# Patient Record
Sex: FEMALE | Race: BLACK OR AFRICAN AMERICAN | NOT HISPANIC OR LATINO
[De-identification: names, ages, dates, MRNs, and addresses within clinical notes are randomized per-mention and may not be internally consistent; named-entity substitution may affect disease eponyms.]

---

## 2017-10-31 ENCOUNTER — APPOINTMENT (OUTPATIENT)
Dept: CARDIOLOGY | Facility: CLINIC | Age: 62
End: 2017-10-31
Payer: COMMERCIAL

## 2017-10-31 ENCOUNTER — NON-APPOINTMENT (OUTPATIENT)
Age: 62
End: 2017-10-31

## 2017-10-31 VITALS
BODY MASS INDEX: 26.29 KG/M2 | OXYGEN SATURATION: 98 % | SYSTOLIC BLOOD PRESSURE: 135 MMHG | WEIGHT: 154 LBS | DIASTOLIC BLOOD PRESSURE: 82 MMHG | HEIGHT: 64 IN | HEART RATE: 76 BPM

## 2017-10-31 DIAGNOSIS — Z83.3 FAMILY HISTORY OF DIABETES MELLITUS: ICD-10-CM

## 2017-10-31 DIAGNOSIS — Z78.9 OTHER SPECIFIED HEALTH STATUS: ICD-10-CM

## 2017-10-31 PROCEDURE — 93000 ELECTROCARDIOGRAM COMPLETE: CPT

## 2017-10-31 PROCEDURE — 99204 OFFICE O/P NEW MOD 45 MIN: CPT

## 2017-12-04 ENCOUNTER — APPOINTMENT (OUTPATIENT)
Dept: CARDIOLOGY | Facility: CLINIC | Age: 62
End: 2017-12-04
Payer: COMMERCIAL

## 2017-12-04 PROCEDURE — 93306 TTE W/DOPPLER COMPLETE: CPT

## 2017-12-04 PROCEDURE — 93015 CV STRESS TEST SUPVJ I&R: CPT

## 2017-12-21 ENCOUNTER — APPOINTMENT (OUTPATIENT)
Dept: CARDIOLOGY | Facility: CLINIC | Age: 62
End: 2017-12-21
Payer: COMMERCIAL

## 2017-12-21 PROCEDURE — A9500: CPT

## 2017-12-21 PROCEDURE — 93015 CV STRESS TEST SUPVJ I&R: CPT

## 2017-12-21 PROCEDURE — 78452 HT MUSCLE IMAGE SPECT MULT: CPT

## 2021-12-10 ENCOUNTER — APPOINTMENT (OUTPATIENT)
Dept: CARDIOLOGY | Facility: CLINIC | Age: 66
End: 2021-12-10
Payer: MEDICARE

## 2021-12-10 VITALS
TEMPERATURE: 98.3 F | HEART RATE: 71 BPM | WEIGHT: 169 LBS | DIASTOLIC BLOOD PRESSURE: 60 MMHG | HEIGHT: 64 IN | BODY MASS INDEX: 28.85 KG/M2 | SYSTOLIC BLOOD PRESSURE: 124 MMHG | OXYGEN SATURATION: 99 %

## 2021-12-10 VITALS — SYSTOLIC BLOOD PRESSURE: 120 MMHG | DIASTOLIC BLOOD PRESSURE: 62 MMHG

## 2021-12-10 DIAGNOSIS — Z86.39 PERSONAL HISTORY OF OTHER ENDOCRINE, NUTRITIONAL AND METABOLIC DISEASE: ICD-10-CM

## 2021-12-10 DIAGNOSIS — R94.31 ABNORMAL ELECTROCARDIOGRAM [ECG] [EKG]: ICD-10-CM

## 2021-12-10 DIAGNOSIS — Z82.49 FAMILY HISTORY OF ISCHEMIC HEART DISEASE AND OTHER DISEASES OF THE CIRCULATORY SYSTEM: ICD-10-CM

## 2021-12-10 DIAGNOSIS — Z78.9 OTHER SPECIFIED HEALTH STATUS: ICD-10-CM

## 2021-12-10 DIAGNOSIS — R00.2 PALPITATIONS: ICD-10-CM

## 2021-12-10 DIAGNOSIS — I10 ESSENTIAL (PRIMARY) HYPERTENSION: ICD-10-CM

## 2021-12-10 DIAGNOSIS — Z00.00 ENCOUNTER FOR GENERAL ADULT MEDICAL EXAMINATION W/OUT ABNORMAL FINDINGS: ICD-10-CM

## 2021-12-10 DIAGNOSIS — E11.9 TYPE 2 DIABETES MELLITUS W/OUT COMPLICATIONS: ICD-10-CM

## 2021-12-10 PROCEDURE — 99205 OFFICE O/P NEW HI 60 MIN: CPT

## 2021-12-10 PROCEDURE — 93000 ELECTROCARDIOGRAM COMPLETE: CPT | Mod: 59

## 2021-12-10 PROCEDURE — 93246 EXT ECG>7D<15D RECORDING: CPT

## 2021-12-10 RX ORDER — METFORMIN HYDROCHLORIDE 1000 MG/1
1000 TABLET, COATED ORAL
Refills: 0 | Status: DISCONTINUED | COMMUNITY
End: 2021-12-10

## 2021-12-10 RX ORDER — GLIMEPIRIDE 4 MG/1
4 TABLET ORAL DAILY
Refills: 0 | Status: ACTIVE | COMMUNITY

## 2021-12-10 RX ORDER — METFORMIN HYDROCHLORIDE 500 MG/1
500 TABLET, FILM COATED, EXTENDED RELEASE ORAL DAILY
Refills: 0 | Status: ACTIVE | COMMUNITY

## 2021-12-10 RX ORDER — METOPROLOL TARTRATE 25 MG/1
25 TABLET, FILM COATED ORAL
Qty: 3 | Refills: 3 | Status: ACTIVE | COMMUNITY
Start: 2021-12-10 | End: 1900-01-01

## 2021-12-10 RX ORDER — LISINOPRIL 5 MG/1
5 TABLET ORAL DAILY
Refills: 0 | Status: ACTIVE | COMMUNITY

## 2021-12-10 RX ORDER — SITAGLIPTIN 100 MG/1
100 TABLET, FILM COATED ORAL DAILY
Refills: 0 | Status: ACTIVE | COMMUNITY

## 2021-12-10 RX ORDER — ATORVASTATIN CALCIUM 10 MG/1
10 TABLET, FILM COATED ORAL
Qty: 60 | Refills: 5 | Status: ACTIVE | COMMUNITY

## 2021-12-10 NOTE — HISTORY OF PRESENT ILLNESS
[FreeTextEntry1] : Abnormal EKG. and palpitaitons. \par \par 62 F with hyperlipidemia and diabetes presents with abnormal ECG.  Low voltage QRS on ECG at PCP.  No routine exercise.  METS > 4.  last stress test 2017.  \par She had palptiations 2 meths ago. she went urgi care. PCP.  and he got an ECg . and the ECtg was abnormal. \par no dizzines. no syncope.\par  \par \par old note: 62 F with hyperlipidemia and diabetes presents with abnormal ECG.  Low voltage QRS on ECG at PCP.  No routine exercise.  METS > 4.   No chest pain or shortness of breath.  No palpitations, no dizziness, syncope. No PND, orthopnea, or LE edema.   Last HbA1c > 8.  Had a stress test, echo, and cardiac monitor - performed she recalls within the past 2 yrs.  Unclear why she had workup in the past.  Poor historian.  She doesn't recall ever being told her ECG was abnormal before.

## 2021-12-10 NOTE — DISCUSSION/SUMMARY
[Patient] : the patient [Risks] : risks [Benefits] : benefits [Alternatives] : alternatives [With Me] : with me [___ Month(s)] : in [unfilled] month(s) [FreeTextEntry1] : 62 F with hyperlipidemia and diabetes presents with abnormal ECG.   \par \par 1) abnormnal EKG suggestive of ischemia.  NO cardiac symptoms. however,. high probablity of obstrucitve coronary artery disease . \par Cardaic cta  for diagnosis.  if cardaic cta does not get approve by insurance then will get Cardiac cath.  if cardiac cath not approved, then exercise nuclear stress test.  2D echo.\par initaites aspirin 81  ct statins,. \par 2) hypertension : ct current meds\par 3) dyslipidemia : ct statins\par 4) palpitatiosn : 4 weeks event monitor. \par Will order and review ECG for the above mentioned diagnosis/condition/symptoms

## 2021-12-10 NOTE — ASSESSMENT
[FreeTextEntry1] : 1. abnormal ECG -   Low voltage QRS with nonspecific T wave abnormalities.  No hx of COPD, not obese. No s/s of pericardial effusion. ? amyloidosis.  ETT and echo.  \par 2. hyperlipidemia - yearly lipid panel and LFTs with PCP.  Continue statin. \par \par Thank you for allowing me to participate in your patient's care.  Please contact me if there are any questions or concerns.\par

## 2021-12-10 NOTE — CARDIOLOGY SUMMARY
[___] : [unfilled] [de-identified] : 12 10 2021   Sinus sampsonm. ST changes suggestive of anterio ischemia.

## 2021-12-10 NOTE — PHYSICAL EXAM
[General Appearance - Well Developed] : well developed [Normal Appearance] : normal appearance [Well Groomed] : well groomed [General Appearance - Well Nourished] : well nourished [No Deformities] : no deformities [General Appearance - In No Acute Distress] : no acute distress [Normal Conjunctiva] : the conjunctiva exhibited no abnormalities [Eyelids - No Xanthelasma] : the eyelids demonstrated no xanthelasmas [Normal Oral Mucosa] : normal oral mucosa [No Oral Pallor] : no oral pallor [No Oral Cyanosis] : no oral cyanosis [Normal Jugular Venous A Waves Present] : normal jugular venous A waves present [Normal Jugular Venous V Waves Present] : normal jugular venous V waves present [No Jugular Venous Desai A Waves] : no jugular venous desai A waves [Heart Rate And Rhythm] : heart rate and rhythm were normal [Heart Sounds] : normal S1 and S2 [Murmurs] : no murmurs present [Respiration, Rhythm And Depth] : normal respiratory rhythm and effort [Exaggerated Use Of Accessory Muscles For Inspiration] : no accessory muscle use [Auscultation Breath Sounds / Voice Sounds] : lungs were clear to auscultation bilaterally [Abdomen Soft] : soft [Abdomen Tenderness] : non-tender [Abdomen Mass (___ Cm)] : no abdominal mass palpated [Abnormal Walk] : normal gait [Gait - Sufficient For Exercise Testing] : the gait was sufficient for exercise testing [Nail Clubbing] : no clubbing of the fingernails [Cyanosis, Localized] : no localized cyanosis [Petechial Hemorrhages (___cm)] : no petechial hemorrhages [Skin Color & Pigmentation] : normal skin color and pigmentation [] : no rash [No Venous Stasis] : no venous stasis [Skin Lesions] : no skin lesions [No Skin Ulcers] : no skin ulcer [No Xanthoma] : no  xanthoma was observed [Oriented To Time, Place, And Person] : oriented to person, place, and time [Affect] : the affect was normal [Mood] : the mood was normal [No Anxiety] : not feeling anxious

## 2021-12-20 LAB
25(OH)D3 SERPL-MCNC: 37 NG/ML
ANION GAP SERPL CALC-SCNC: 13 MMOL/L
APO B SERPL-MCNC: 96 MG/DL
APO LP(A) SERPL-MCNC: 26.7 NMOL/L
BUN SERPL-MCNC: 7 MG/DL
CALCIUM SERPL-MCNC: 9.8 MG/DL
CHLORIDE SERPL-SCNC: 102 MMOL/L
CHOLEST SERPL-MCNC: 202 MG/DL
CO2 SERPL-SCNC: 23 MMOL/L
CREAT SERPL-MCNC: 0.61 MG/DL
GLUCOSE SERPL-MCNC: 129 MG/DL
HDLC SERPL-MCNC: 70 MG/DL
LDLC SERPL CALC-MCNC: 113 MG/DL
NONHDLC SERPL-MCNC: 133 MG/DL
POTASSIUM SERPL-SCNC: 4.1 MMOL/L
SODIUM SERPL-SCNC: 138 MMOL/L
TRIGL SERPL-MCNC: 97 MG/DL
TSH SERPL-ACNC: 1.3 UIU/ML

## 2021-12-21 ENCOUNTER — APPOINTMENT (OUTPATIENT)
Dept: CARDIOLOGY | Facility: CLINIC | Age: 66
End: 2021-12-21
Payer: MEDICARE

## 2021-12-21 PROCEDURE — 93306 TTE W/DOPPLER COMPLETE: CPT

## 2021-12-22 ENCOUNTER — OUTPATIENT (OUTPATIENT)
Dept: OUTPATIENT SERVICES | Facility: HOSPITAL | Age: 66
LOS: 1 days | End: 2021-12-22
Payer: MEDICARE

## 2021-12-22 DIAGNOSIS — E11.9 TYPE 2 DIABETES MELLITUS WITHOUT COMPLICATIONS: ICD-10-CM

## 2021-12-22 PROCEDURE — 75574 CT ANGIO HRT W/3D IMAGE: CPT | Mod: 26

## 2021-12-22 PROCEDURE — 75574 CT ANGIO HRT W/3D IMAGE: CPT

## 2022-01-14 ENCOUNTER — NON-APPOINTMENT (OUTPATIENT)
Age: 67
End: 2022-01-14

## 2022-06-07 ENCOUNTER — APPOINTMENT (OUTPATIENT)
Dept: CARDIOLOGY | Facility: CLINIC | Age: 67
End: 2022-06-07

## 2023-01-25 ENCOUNTER — OFFICE (OUTPATIENT)
Dept: URBAN - METROPOLITAN AREA CLINIC 94 | Facility: CLINIC | Age: 68
Setting detail: OPHTHALMOLOGY
End: 2023-01-25
Payer: MEDICARE

## 2023-01-25 DIAGNOSIS — E11.3293: ICD-10-CM

## 2023-01-25 DIAGNOSIS — E11.9: ICD-10-CM

## 2023-01-25 DIAGNOSIS — H25.13: ICD-10-CM

## 2023-01-25 DIAGNOSIS — Z79.84: ICD-10-CM

## 2023-01-25 DIAGNOSIS — H40.013: ICD-10-CM

## 2023-01-25 DIAGNOSIS — H16.223: ICD-10-CM

## 2023-01-25 PROCEDURE — 92012 INTRM OPH EXAM EST PATIENT: CPT | Performed by: OPHTHALMOLOGY

## 2023-01-25 ASSESSMENT — VISUAL ACUITY
OD_BCVA: 20/40
OS_BCVA: 20/25-1

## 2023-01-25 ASSESSMENT — SPHEQUIV_DERIVED
OS_SPHEQUIV: 1.75
OD_SPHEQUIV: 1.375

## 2023-01-25 ASSESSMENT — PACHYMETRY
OS_CT_UM: 568
OD_CT_CORRECTION: -4
OS_CT_CORRECTION: -1
OD_CT_UM: 596

## 2023-01-25 ASSESSMENT — KERATOMETRY
OS_K1POWER_DIOPTERS: 41.00
OD_AXISANGLE_DEGREES: 064
OD_K1POWER_DIOPTERS: 41.00
OD_K2POWER_DIOPTERS: 41.50
OS_AXISANGLE_DEGREES: 167
OS_K2POWER_DIOPTERS: 41.50

## 2023-01-25 ASSESSMENT — TONOMETRY: OD_IOP_MMHG: 20

## 2023-01-25 ASSESSMENT — REFRACTION_AUTOREFRACTION
OS_SPHERE: +2.00
OS_CYLINDER: -0.50
OD_SPHERE: +1.75
OD_CYLINDER: -0.75
OD_AXIS: 094
OS_AXIS: 094

## 2023-01-25 ASSESSMENT — AXIALLENGTH_DERIVED
OD_AL: 23.8863
OS_AL: 23.7374

## 2023-01-25 ASSESSMENT — SUPERFICIAL PUNCTATE KERATITIS (SPK)
OS_SPK: 1+
OD_SPK: 1+

## 2023-01-25 ASSESSMENT — CONFRONTATIONAL VISUAL FIELD TEST (CVF)
OS_FINDINGS: FULL
OD_FINDINGS: FULL

## 2023-05-24 ENCOUNTER — Encounter (OUTPATIENT)
Dept: URBAN - METROPOLITAN AREA CLINIC 94 | Facility: CLINIC | Age: 68
Setting detail: OPHTHALMOLOGY
End: 2023-05-24
Payer: MEDICARE

## 2023-05-24 DIAGNOSIS — Z79.84: ICD-10-CM

## 2023-05-24 DIAGNOSIS — H40.013: ICD-10-CM

## 2023-05-24 DIAGNOSIS — H16.223: ICD-10-CM

## 2023-05-24 DIAGNOSIS — E11.3293: ICD-10-CM

## 2023-05-24 DIAGNOSIS — E11.9: ICD-10-CM

## 2023-05-24 DIAGNOSIS — H25.13: ICD-10-CM

## 2023-05-24 PROCEDURE — 99213 OFFICE O/P EST LOW 20 MIN: CPT | Performed by: PHYSICIAN ASSISTANT

## 2023-05-24 PROCEDURE — 92083 EXTENDED VISUAL FIELD XM: CPT | Performed by: PHYSICIAN ASSISTANT

## 2023-05-24 ASSESSMENT — REFRACTION_AUTOREFRACTION
OD_AXIS: 099
OS_CYLINDER: -1.00
OS_AXIS: 094
OD_SPHERE: +2.25
OS_SPHERE: +2.25
OD_CYLINDER: -1.25

## 2023-05-24 ASSESSMENT — PACHYMETRY
OD_CT_UM: 596
OS_CT_UM: 568
OS_CT_CORRECTION: -1
OD_CT_CORRECTION: -4

## 2023-05-24 ASSESSMENT — VISUAL ACUITY
OS_BCVA: 20/20
OD_BCVA: 20/25-1

## 2023-05-24 ASSESSMENT — KERATOMETRY
OS_K2POWER_DIOPTERS: 41.50
OS_K1POWER_DIOPTERS: 40.75
OD_K1POWER_DIOPTERS: 41.25
OS_AXISANGLE_DEGREES: 166
OD_K2POWER_DIOPTERS: 41.50
OD_AXISANGLE_DEGREES: 007

## 2023-05-24 ASSESSMENT — SUPERFICIAL PUNCTATE KERATITIS (SPK)
OS_SPK: 1+
OD_SPK: 1+

## 2023-05-24 ASSESSMENT — TONOMETRY
OS_IOP_MMHG: 18
OD_IOP_MMHG: 21
OD_IOP_MMHG: 19

## 2023-05-24 ASSESSMENT — CONFRONTATIONAL VISUAL FIELD TEST (CVF)
OS_FINDINGS: FULL
OD_FINDINGS: FULL

## 2023-05-24 ASSESSMENT — SPHEQUIV_DERIVED
OS_SPHEQUIV: 1.75
OD_SPHEQUIV: 1.625

## 2023-05-24 ASSESSMENT — AXIALLENGTH_DERIVED
OD_AL: 23.7403
OS_AL: 23.784

## 2023-11-22 ENCOUNTER — OFFICE (OUTPATIENT)
Dept: URBAN - METROPOLITAN AREA CLINIC 94 | Facility: CLINIC | Age: 68
Setting detail: OPHTHALMOLOGY
End: 2023-11-22
Payer: MEDICARE

## 2023-11-22 DIAGNOSIS — E11.3293: ICD-10-CM

## 2023-11-22 DIAGNOSIS — Z79.84: ICD-10-CM

## 2023-11-22 DIAGNOSIS — H25.13: ICD-10-CM

## 2023-11-22 DIAGNOSIS — H40.013: ICD-10-CM

## 2023-11-22 PROCEDURE — 92014 COMPRE OPH EXAM EST PT 1/>: CPT | Performed by: OPHTHALMOLOGY

## 2023-11-22 PROCEDURE — 92250 FUNDUS PHOTOGRAPHY W/I&R: CPT | Performed by: OPHTHALMOLOGY

## 2023-11-22 ASSESSMENT — REFRACTION_AUTOREFRACTION
OS_CYLINDER: -1.00
OD_AXIS: 089
OS_AXIS: 097
OD_SPHERE: +2.00
OD_CYLINDER: -0.75
OS_SPHERE: +2.25

## 2023-11-22 ASSESSMENT — CONFRONTATIONAL VISUAL FIELD TEST (CVF)
OD_FINDINGS: FULL
OS_FINDINGS: FULL

## 2023-11-22 ASSESSMENT — SUPERFICIAL PUNCTATE KERATITIS (SPK)
OD_SPK: 1+
OS_SPK: 1+

## 2023-11-22 ASSESSMENT — SPHEQUIV_DERIVED
OD_SPHEQUIV: 1.625
OS_SPHEQUIV: 1.75

## 2024-05-22 ENCOUNTER — OFFICE (OUTPATIENT)
Dept: URBAN - METROPOLITAN AREA CLINIC 94 | Facility: CLINIC | Age: 69
Setting detail: OPHTHALMOLOGY
End: 2024-05-22
Payer: MEDICARE

## 2024-05-22 DIAGNOSIS — H40.013: ICD-10-CM

## 2024-05-22 PROCEDURE — 92133 CPTRZD OPH DX IMG PST SGM ON: CPT | Performed by: PHYSICIAN ASSISTANT

## 2024-05-22 PROCEDURE — 92014 COMPRE OPH EXAM EST PT 1/>: CPT | Performed by: PHYSICIAN ASSISTANT

## 2024-05-22 ASSESSMENT — CONFRONTATIONAL VISUAL FIELD TEST (CVF)
OS_FINDINGS: FULL
OD_FINDINGS: FULL

## 2024-11-13 ENCOUNTER — OFFICE (OUTPATIENT)
Dept: URBAN - METROPOLITAN AREA CLINIC 94 | Facility: CLINIC | Age: 69
Setting detail: OPHTHALMOLOGY
End: 2024-11-13
Payer: MEDICARE

## 2024-11-13 DIAGNOSIS — E11.3293: ICD-10-CM

## 2024-11-13 DIAGNOSIS — H16.223: ICD-10-CM

## 2024-11-13 DIAGNOSIS — H40.013: ICD-10-CM

## 2024-11-13 DIAGNOSIS — E11.9: ICD-10-CM

## 2024-11-13 DIAGNOSIS — H25.13: ICD-10-CM

## 2024-11-13 PROCEDURE — 92250 FUNDUS PHOTOGRAPHY W/I&R: CPT | Performed by: OPHTHALMOLOGY

## 2024-11-13 PROCEDURE — 92014 COMPRE OPH EXAM EST PT 1/>: CPT | Performed by: OPHTHALMOLOGY

## 2024-11-13 ASSESSMENT — SUPERFICIAL PUNCTATE KERATITIS (SPK)
OD_SPK: 1+
OS_SPK: 1+

## 2024-11-13 ASSESSMENT — REFRACTION_AUTOREFRACTION
OD_CYLINDER: -1.00
OD_SPHERE: +2.25
OS_SPHERE: +2.75
OS_AXIS: 100
OS_CYLINDER: -1.00
OD_AXIS: 095

## 2024-11-13 ASSESSMENT — PACHYMETRY
OD_CT_CORRECTION: -4
OD_CT_UM: 596
OS_CT_UM: 568
OS_CT_CORRECTION: -1

## 2024-11-13 ASSESSMENT — VISUAL ACUITY
OD_BCVA: 20/30
OS_BCVA: 20/30-1

## 2024-11-13 ASSESSMENT — CONFRONTATIONAL VISUAL FIELD TEST (CVF)
OS_FINDINGS: FULL
OD_FINDINGS: FULL

## 2024-11-13 ASSESSMENT — TONOMETRY
OD_IOP_MMHG: 20
OD_IOP_MMHG: 21
OS_IOP_MMHG: 20

## 2024-11-13 ASSESSMENT — KERATOMETRY
OS_K1POWER_DIOPTERS: 41.00
OD_K2POWER_DIOPTERS: 41.00
OS_K2POWER_DIOPTERS: 41.50
OD_AXISANGLE_DEGREES: 085
OS_AXISANGLE_DEGREES: 170
OD_K1POWER_DIOPTERS: 40.75

## 2025-05-14 ENCOUNTER — OFFICE (OUTPATIENT)
Dept: URBAN - METROPOLITAN AREA CLINIC 94 | Facility: CLINIC | Age: 70
Setting detail: OPHTHALMOLOGY
End: 2025-05-14
Payer: MEDICARE

## 2025-05-14 DIAGNOSIS — H25.13: ICD-10-CM

## 2025-05-14 DIAGNOSIS — H40.013: ICD-10-CM

## 2025-05-14 DIAGNOSIS — H16.223: ICD-10-CM

## 2025-05-14 DIAGNOSIS — E11.3293: ICD-10-CM

## 2025-05-14 PROCEDURE — 92133 CPTRZD OPH DX IMG PST SGM ON: CPT | Performed by: OPHTHALMOLOGY

## 2025-05-14 PROCEDURE — 92014 COMPRE OPH EXAM EST PT 1/>: CPT | Performed by: OPHTHALMOLOGY

## 2025-05-14 ASSESSMENT — KERATOMETRY
OS_AXISANGLE_DEGREES: 175
OS_K2POWER_DIOPTERS: 41.50
OD_K1POWER_DIOPTERS: 41.00
OD_AXISANGLE_DEGREES: 126
OD_K2POWER_DIOPTERS: 41.25
OS_K1POWER_DIOPTERS: 40.50

## 2025-05-14 ASSESSMENT — PACHYMETRY
OD_CT_UM: 596
OS_CT_CORRECTION: -1
OS_CT_UM: 568
OD_CT_CORRECTION: -4

## 2025-05-14 ASSESSMENT — TONOMETRY
OS_IOP_MMHG: 16
OD_IOP_MMHG: 16

## 2025-05-14 ASSESSMENT — REFRACTION_AUTOREFRACTION
OD_SPHERE: +2.00
OS_SPHERE: +2.50
OD_AXIS: 096
OD_CYLINDER: -1.00
OS_AXIS: 094
OS_CYLINDER: -1.50

## 2025-05-14 ASSESSMENT — VISUAL ACUITY
OD_BCVA: 20/25-1
OS_BCVA: 20/25

## 2025-05-14 ASSESSMENT — SUPERFICIAL PUNCTATE KERATITIS (SPK)
OD_SPK: 1+
OS_SPK: 1+

## 2025-05-14 ASSESSMENT — CONFRONTATIONAL VISUAL FIELD TEST (CVF)
OD_FINDINGS: FULL
OS_FINDINGS: FULL